# Patient Record
Sex: MALE | URBAN - METROPOLITAN AREA
[De-identification: names, ages, dates, MRNs, and addresses within clinical notes are randomized per-mention and may not be internally consistent; named-entity substitution may affect disease eponyms.]

---

## 2024-07-23 ENCOUNTER — APPOINTMENT (RX ONLY)
Dept: URBAN - METROPOLITAN AREA CLINIC 28 | Facility: CLINIC | Age: 39
Setting detail: DERMATOLOGY
End: 2024-07-23

## 2024-07-23 DIAGNOSIS — L21.8 OTHER SEBORRHEIC DERMATITIS: ICD-10-CM | Status: INADEQUATELY CONTROLLED

## 2024-07-23 DIAGNOSIS — Z12.83 ENCOUNTER FOR SCREENING FOR MALIGNANT NEOPLASM OF SKIN: ICD-10-CM

## 2024-07-23 DIAGNOSIS — L72.8 OTHER FOLLICULAR CYSTS OF THE SKIN AND SUBCUTANEOUS TISSUE: ICD-10-CM

## 2024-07-23 DIAGNOSIS — D22 MELANOCYTIC NEVI: ICD-10-CM

## 2024-07-23 DIAGNOSIS — L85.3 XEROSIS CUTIS: ICD-10-CM

## 2024-07-23 PROBLEM — D22.39 MELANOCYTIC NEVI OF OTHER PARTS OF FACE: Status: ACTIVE | Noted: 2024-07-23

## 2024-07-23 PROBLEM — D22.61 MELANOCYTIC NEVI OF RIGHT UPPER LIMB, INCLUDING SHOULDER: Status: ACTIVE | Noted: 2024-07-23

## 2024-07-23 PROCEDURE — ? OBSERVATION AND MEASURE

## 2024-07-23 PROCEDURE — ? PRESCRIPTION

## 2024-07-23 PROCEDURE — ? COUNSELING

## 2024-07-23 PROCEDURE — 99214 OFFICE O/P EST MOD 30 MIN: CPT

## 2024-07-23 PROCEDURE — ? PRESCRIPTION MEDICATION MANAGEMENT

## 2024-07-23 RX ORDER — ROFLUMILAST 3 MG/G
CREAM TOPICAL
Qty: 60 | Refills: 2 | Status: ERX | COMMUNITY
Start: 2024-07-23

## 2024-07-23 RX ADMIN — ROFLUMILAST: 3 CREAM TOPICAL at 00:00

## 2024-07-23 ASSESSMENT — LOCATION ZONE DERM
LOCATION ZONE: SCALP
LOCATION ZONE: LEG
LOCATION ZONE: NECK
LOCATION ZONE: FACE
LOCATION ZONE: AXILLAE

## 2024-07-23 ASSESSMENT — LOCATION DETAILED DESCRIPTION DERM
LOCATION DETAILED: LEFT CENTRAL POSTAURICULAR SKIN
LOCATION DETAILED: MID POSTERIOR NECK
LOCATION DETAILED: LEFT KNEE
LOCATION DETAILED: RIGHT INFERIOR MEDIAL FOREHEAD
LOCATION DETAILED: RIGHT AXILLARY VAULT
LOCATION DETAILED: RIGHT MEDIAL MALAR CHEEK
LOCATION DETAILED: LEFT LATERAL FOREHEAD
LOCATION DETAILED: RIGHT LATERAL FOREHEAD
LOCATION DETAILED: LEFT MEDIAL MALAR CHEEK
LOCATION DETAILED: RIGHT DISTAL PRETIBIAL REGION
LOCATION DETAILED: RIGHT CENTRAL POSTAURICULAR SKIN

## 2024-07-23 ASSESSMENT — LOCATION SIMPLE DESCRIPTION DERM
LOCATION SIMPLE: LEFT KNEE
LOCATION SIMPLE: SCALP
LOCATION SIMPLE: POSTERIOR NECK
LOCATION SIMPLE: RIGHT AXILLARY VAULT
LOCATION SIMPLE: RIGHT CHEEK
LOCATION SIMPLE: LEFT CHEEK
LOCATION SIMPLE: RIGHT FOREHEAD
LOCATION SIMPLE: LEFT FOREHEAD
LOCATION SIMPLE: RIGHT PRETIBIAL REGION

## 2024-07-23 NOTE — PROCEDURE: OBSERVATION
.The patient was called for notification of a POSITIVE test result for COVID-19. The following information was given to the patient:     The COVID-19 test result was positive   Mild and stable symptoms are managed at home     Treatment of coronavirus does not require an antibiotic   Remain isolated for 10 days since symptoms first appeared AND at least 3 days have passed after recovery    o Recovery is defined as resolution of fever without the use of fever-reducing medications with progressive improvement or resolution of other symptoms     Wash hands often with soap and water for at least 20 seconds or alternatively use hand  with at least 60% alcohol content   Cover coughs and sneezes   Wear a mask when around others if possible   Clean all high-touch surfaces every day, such as doorknobs and cellphones   Continually monitor symptoms.  Contact your medical provider if symptoms are worsening, such as difficulty breathing   For more information visit the CDC website: DotProtection.gl
Detail Level: Detailed
Size Of Lesion: 1.5MM
Morphology Per Location (Optional): blue papule
Instructions (Optional): old and stable, RTC changes
Size Of Lesion: 3x4mm
Morphology Per Location (Optional): Tan macule

## 2024-07-23 NOTE — HPI: OTHER
Condition:: Full body exam
Please Describe Your Condition:: is an established patient who is being seen for a chief complaint of Full body exam. Patient does not have a history of skin cancer. His old mole of his right glabella has not changed. He also requests that we map a mole he watches in his right anterior armpit, but no known changes. \\nHe comes QYr for FBSE.
Condition:: Knot
Please Describe Your Condition:: is an established patient who is being seen for a chief complaint of a small Knot he can feel o under the skin on the left posterior neck at the hair line. He just wants her to look at it. No symptoms, not changing.
Condition:: Karthik
Please Describe Your Condition:: The patient has chronic seborrheic dermatitis of his face and scalp. He uses dandruff shampoos. He had an old Rx to use on his face with flares but he hardly ever uses it. He is flaring around the central face now.
Condition:: Xerosis
Please Describe Your Condition:: The patient asks about dry rough patches of his right lateral foot and both knees. What can he use.

## 2024-07-23 NOTE — PROCEDURE: PRESCRIPTION MEDICATION MANAGEMENT
Render In Strict Bullet Format?: No
Initiate Treatment: Zoryve 0.3 % topical cream Apply to the affected areas of the face daily as needed
Samples Given: Zoryve cream
Detail Level: Zone

## 2024-07-23 NOTE — PROCEDURE: COUNSELING
Skin Checks Recommendations: SSE q month\\nFBSE with derm q year
Detail Level: Generalized
Detail Level: Detailed
Patient Specific Counseling (Will Not Stick From Patient To Patient): -\\n\\nReviewed that it looks and feels okay, small and not inflammed\\nRecommended to observe and not to pick or squeeze it, because that might make it angry. \\nThe only way to get rid of it would be to have a surgeon remove it, but not necessary right now. He agrees with observation.
Patient Specific Counseling (Will Not Stick From Patient To Patient): -\\n-\\nOn FACE & SCALP, flaring mostly on face-\\n\\nPatient has not really been using the old face prescription; only with bad flares. \\n\\nNature reviewed \\nRecommended to wash the face and scalp q d with over the counter dandruff shampoos.\\nReviewed that there is a new topical medication that has really been working well for some people that he can try; Zoryve cream.\\n\\nPrestige information given to the patient, sample given too
Detail Level: Zone
Detail Level: Simple
Cleanser Recommendations: Dove soap, can continue\\n\\n\\n\\n\\n\\n\\n\\n\\n\\n\\n\\n\\n\\n\\n\\n\\n\\n\\n\\n
Patient Specific Counseling (Will Not Stick From Patient To Patient): -\\n\\nReviewed that it really does look like dry skin. \\nRecommended over the counter Amlactin lotion on these areas but make sure not to apply it to any sensitive areas because it can sting.

## 2025-07-23 ENCOUNTER — APPOINTMENT (OUTPATIENT)
Dept: URBAN - METROPOLITAN AREA CLINIC 28 | Facility: CLINIC | Age: 40
Setting detail: DERMATOLOGY
End: 2025-07-23

## 2025-07-23 DIAGNOSIS — D17 BENIGN LIPOMATOUS NEOPLASM: ICD-10-CM

## 2025-07-23 DIAGNOSIS — Z12.83 ENCOUNTER FOR SCREENING FOR MALIGNANT NEOPLASM OF SKIN: ICD-10-CM

## 2025-07-23 DIAGNOSIS — L82.1 OTHER SEBORRHEIC KERATOSIS: ICD-10-CM

## 2025-07-23 DIAGNOSIS — L21.8 OTHER SEBORRHEIC DERMATITIS: ICD-10-CM | Status: STABLE

## 2025-07-23 DIAGNOSIS — D22 MELANOCYTIC NEVI: ICD-10-CM

## 2025-07-23 PROBLEM — D22.61 MELANOCYTIC NEVI OF RIGHT UPPER LIMB, INCLUDING SHOULDER: Status: ACTIVE | Noted: 2025-07-23

## 2025-07-23 PROBLEM — D22.39 MELANOCYTIC NEVI OF OTHER PARTS OF FACE: Status: ACTIVE | Noted: 2025-07-23

## 2025-07-23 PROBLEM — D22.5 MELANOCYTIC NEVI OF TRUNK: Status: ACTIVE | Noted: 2025-07-23

## 2025-07-23 PROBLEM — D17.24 BENIGN LIPOMATOUS NEOPLASM OF SKIN AND SUBCUTANEOUS TISSUE OF LEFT LEG: Status: ACTIVE | Noted: 2025-07-23

## 2025-07-23 PROCEDURE — ? OBSERVATION

## 2025-07-23 PROCEDURE — ? RECOMMENDATIONS

## 2025-07-23 PROCEDURE — ? COUNSELING

## 2025-07-23 ASSESSMENT — LOCATION ZONE DERM
LOCATION ZONE: LEG
LOCATION ZONE: FACE
LOCATION ZONE: TRUNK
LOCATION ZONE: ARM
LOCATION ZONE: AXILLAE
LOCATION ZONE: HAND

## 2025-07-23 ASSESSMENT — LOCATION SIMPLE DESCRIPTION DERM
LOCATION SIMPLE: RIGHT CHEEK
LOCATION SIMPLE: RIGHT AXILLARY VAULT
LOCATION SIMPLE: LEFT PRETIBIAL REGION
LOCATION SIMPLE: LEFT HAND
LOCATION SIMPLE: RIGHT UPPER BACK
LOCATION SIMPLE: ABDOMEN
LOCATION SIMPLE: RIGHT FOREARM
LOCATION SIMPLE: RIGHT FOREHEAD
LOCATION SIMPLE: LEFT CHEEK

## 2025-07-23 ASSESSMENT — LOCATION DETAILED DESCRIPTION DERM
LOCATION DETAILED: RIGHT AXILLARY VAULT
LOCATION DETAILED: LEFT MEDIAL MALAR CHEEK
LOCATION DETAILED: RIGHT MEDIAL MALAR CHEEK
LOCATION DETAILED: RIGHT SUPERIOR MEDIAL UPPER BACK
LOCATION DETAILED: RIGHT DISTAL DORSAL FOREARM
LOCATION DETAILED: RIGHT INFERIOR MEDIAL FOREHEAD
LOCATION DETAILED: LEFT PROXIMAL PRETIBIAL REGION
LOCATION DETAILED: LEFT ULNAR DORSAL HAND
LOCATION DETAILED: EPIGASTRIC SKIN

## 2025-07-23 NOTE — HPI: OTHER
Condition:: Full body exam
Please Describe Your Condition:: Patient does not have a history of skin cancer.
Condition:: Lesion
Please Describe Your Condition:: is an established patient who is being seen for a chief complaint of Lesion . \\n\\nPatient reports a bump under the skin on his left shin that has noticed since 09/2024, no symptoms or changes. \\n\\nPatient reports a spot on his right forearm that he noticed 04/2025, he thinks it just looks different than his other spots. No symptoms but he is unsure if it has changed or not.

## 2025-07-23 NOTE — PROCEDURE: COUNSELING
Patient Specific Counseling (Will Not Stick From Patient To Patient): -\\n\\nNature reviewed in detail \\n\\nOne on his right forearm and he is making some baby ones on his left dorsal hand.
Detail Level: Detailed
Detail Level: Simple
Detail Level: Zone
Skin Checks Recommendations: SSE q month\\nFBSE with derm q year
Detail Level: Generalized
Patient Specific Counseling (Will Not Stick From Patient To Patient): -\\n\\nFace and scalp\\n\\nPatient never picked up the Zoryve cream, but it is stable.

## 2025-07-23 NOTE — PROCEDURE: OBSERVATION
Detail Level: Detailed
Size Of Lesion: 1.5MM
Morphology Per Location (Optional): blue papule
Instructions (Optional): old and stable, RTC changes
Size Of Lesion: 3x4mm
Morphology Per Location (Optional): Tan macule
Size Of Lesion: 2cm
Morphology Per Location (Optional): Rubbery, mobile
Instructions (Optional): Reviewed lipomas are not known to turn bad, so we usually leave them alone.  \\nThere is a rare form that is called a liposarcoma or other soft tissue cancers; but the warning signs are that it would be causing symptoms or growing or becoming hard/firm. \\n\\n If it starts to cause any symptoms or becomes rock hard then let us know and we will get him to a surgeons office for removal. The patient agrees with monitoring and will ask PCP to follow it too.

## 2025-07-23 NOTE — PROCEDURE: RECOMMENDATIONS
Recommendation Preamble: The following recommendations were made during the visit:
Render Risk Assessment In Note?: no
Detail Level: Zone
Recommendations (Free Text): Recommended to use a moisturizer with SPF every single morning, he has the elta one at home but he does not really use it like he should.